# Patient Record
Sex: MALE | Race: WHITE
[De-identification: names, ages, dates, MRNs, and addresses within clinical notes are randomized per-mention and may not be internally consistent; named-entity substitution may affect disease eponyms.]

---

## 2019-01-30 ENCOUNTER — HOSPITAL ENCOUNTER (EMERGENCY)
Dept: HOSPITAL 75 - ER | Age: 19
Discharge: HOME | End: 2019-01-30
Payer: SELF-PAY

## 2019-01-30 VITALS — HEIGHT: 71 IN | BODY MASS INDEX: 28 KG/M2 | WEIGHT: 200 LBS

## 2019-01-30 VITALS — DIASTOLIC BLOOD PRESSURE: 99 MMHG | SYSTOLIC BLOOD PRESSURE: 152 MMHG
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
## 2019-01-30 DIAGNOSIS — S93.401A: Primary | ICD-10-CM
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
## 2019-01-30 PROCEDURE — 73630 X-RAY EXAM OF FOOT: CPT

## 2019-01-30 PROCEDURE — 93926 LOWER EXTREMITY STUDY: CPT

## 2019-01-30 PROCEDURE — 73610 X-RAY EXAM OF ANKLE: CPT

## 2019-01-30 NOTE — DIAGNOSTIC IMAGING REPORT
PROCEDURE: US right lower extremity venous.



TECHNIQUE: Multiple real-time grayscale images were obtained over

the right lower extremity in various projections. Additional

spectral analysis and color Doppler duplex images were also

obtained.



INDICATION: Leg pain and swelling.



There are no prior studies available for comparison. There is

generally good blood flow and compressibility at all levels.

There is no evidence for deep venous thrombosis.



IMPRESSION: There is no evidence for deep venous thrombosis of

the right lower extremity.



Dictated by: 



  Dictated on workstation # [id number]

## 2019-01-30 NOTE — CONSULTATION REPORT
DATE OF SERVICE:  01/30/2019



ORTHOPEDIC CONSULTATION



IMPRESSION:

Grade III right ankle sprain.



RECOMMENDATIONS:

1.  Controlled ankle motion walker boot immobilization right ankle.

2.  Continue crutch ambulation touchdown weightbearing right leg.

3.  Aggressive strict elevation of the right lower extremity above heart level.

4.  Continued ice application, right ankle and right foot times in next 48 to 72

hours.



HISTORY AND PHYSICAL EXAMINATION:

The patient is a 18-year-old male who was seen in the emergency room at

[facility].  The patient has been playing basketball with

friends on the Monday prior to this Wednesday examination.  The patient states

that he went to cut and push off on his right foot and his right ankle inverted

and rolled.  He noted immediate pain and difficulty ambulating afterwards.  The

patient had obtained crutches on the day following that.  He has been backing

off the weight that he was bearing on his right lower extremity.  He began

noticing increased pain and noted an inability to fully extend his

interphalangeal joint of the right great toe without discomfort, presented to

the emergency room.  There was a concern about the possibility of a compartment

syndrome and orthopedic consultation was requested.



On exam, the patient demonstrates significant swelling over the medial and

lateral aspect of the right ankle.  The patient has no tenderness over the

deltoid ligament.  The patient has significant tenderness over the entire

lateral ligamentous complex of the right ankle including the anterior

talofibular ligament, calcaneofibular ligament and the posterior talofibular

ligament.  There is no tenderness in the interosseous space.  He has swelling on

the dorsal aspect of the right foot.  He has intact sensation to the toes of the

right foot.  His dorsalis pedis is difficult to palpate.  He has good capillary

refill in the toes with compression of the nail bed.



The patient maintains the right toe in a 45-degree flexed position.  He has pain

when he attempts to actively extend the great toe on the right, but his toe can

be extended, when this is performed, he has some tenderness over the posterior

medial aspect of his right ankle.  On compressing his compartments in the right

ankle, the patient demonstrates no evidence of distention of his calf

compartments.  He has no pain with compression of his calf musculature on the

right compared to his uninvolved left leg.  There is ecchymosis over the

posterolateral aspect of the right heel.  There is ecchymosis on the lateral

aspect of the right ankle.  He does not have tenderness otitis over his anterior

compartment on deep palpation.



X-rays of the right ankle were reviewed revealing no fracture or dislocation,

there is no widening of the mortise.



X-rays also obtained in the emergency room in the patient's right foot

demonstrates no bony abnormalities.



Concern from the emergency room personnel was that the patient's difficulty in

extending his great toe on the right.  He does not have any clinical evidence of

elevated compartment pressures on his exam.  He has some mild discomfort with

ankle dorsiflexion and plantar flexion.  He has a significant amount of

tenderness with any attempts to passively inward the left ankle.  The fact his

x-rays demonstrated no abnormalities.  The fact that he has intact sensation,

good capillary refill was reviewed with the patient.  I would recommend the

patient be placed in a controlled ankle motion walker boot.  He does have

crutches at this time.  I have asked the patient to continue with touchdown

weightbearing only.  He has only been elevating his ankle somewhat at nighttime

and have discussed the fact that he is to strictly elevate his right foot and

ankle above heart level and continue with ice application to his right ankle to

decrease amount of swelling he has in his right lower extremity.  I will follow

the patient for repeat examination in the office in one week's time, advised the

patient that more than likely he will require the boot on his right ankle for at

least 3 weeks then we would advance him into Aircast type brace for that.



Thank you for allowing me to participate in this patient's care.





Job ID: [id number]

DocumentID: [id number]

Dictated Date:  01/30/2019 13:50:33

Transcription Date: 01/30/2019 23:03:56

Dictated By: MAE NIXON DO

## 2019-01-30 NOTE — DIAGNOSTIC IMAGING REPORT
EXAMINATION: Right foot 1024h. 



INDICATION: Injury



3 views were obtained.



There is no fracture, dislocation or acute bony abnormality

evident. The Lisfranc joint appears to be intact. There is mild

generalized soft tissue edema. There is no radiopaque foreign

body identified. 



IMPRESSION:

There is soft tissue edema of the foot but there is no for

evidence of an acute bony abnormality or for a radiopaque foreign

body. 



Dictated by: 



  Dictated on workstation # [id number]

## 2019-01-30 NOTE — DIAGNOSTIC IMAGING REPORT
EXAMINATION: Right ankle at 10:21H.



INDICATION: Injury ankle pain.



 3 views were obtained.



 There are no prior studies available for comparison.



There is no fracture, dislocation or acute bony abnormality

evident. The ankle mortise is not widened. The talar dome is

smooth.



There is generalized soft tissue edema about the ankle joint,

particularly over the lateral malleolus.



IMPRESSION:

There is soft tissue edema about the ankle joint but there is no

evidence for an acute bony abnormality. 



Dictated by: 



  Dictated on workstation # [id number]

## 2019-01-30 NOTE — DIAGNOSTIC IMAGING REPORT
INDICATION: Leg pain and swelling. 



EXAMINATION: Right lower extremity Doppler. Spectral and color

flow imaging of the arterial system of the right lower extremity

were performed. 



COMPARISON: There are no prior studies available for comparison. 



FINDINGS: There is good arterial blood flow in the common

femoral, superficial femoral and popliteal arteries. Triphasic

waveforms were seen and there is no abrupt alteration of the

velocities to suggest a hemodynamically significant stenosis. 



There is also blood flow in the trifurcation arteries although

the waveforms do appear dampened. Furthermore, there is a marked

reduction in the velocity in the dorsalis pedis artery. There is

only a small amount of blood flow in this vessel. The reason for

the diminished arterial blood flow is of uncertain etiology.

There is no evidence for an intra-arterial thrombus.



IMPRESSION:

1. There is generally good arterial blood flow to the right lower

extremity but there is markedly reduced velocity of the dorsalis

pedis artery.

2. These results were discussed with Dr. Milton in the ER.  



Dictated by: 



  Dictated on workstation # [id number]

## 2020-02-07 NOTE — ED LOWER EXTREMITY
General


Chief Complaint:  Lower Extremity


Stated Complaint:  ANKLE INJ


Nursing Triage Note:  


ARRIVED VIA AMB USING CRUTCHES.  STATES HE HURT HIS RIGHT FOOT/ANKLE PLAYING 


BASKETBALL X2 DAYS AGO.  WENT TO THE [facility] TODAY WHO SENT HIM 


HERE.


Nursing Sepsis Screen:  No Definite Risk


Source:  patient


Exam Limitations:  no limitations





History of Present Illness


Date Seen by Provider:  2019


Time Seen by Provider:  10:01


Initial Comments


This 18-year-old young man presents to the emergency room as directed by Dr. Joy from \[facility]\"" [facility] with concerns about a right foot and ankle 

injury.  Patient rolled his foot by inversion while playing basketball 2 days 

ago.  It has become markedly swollen and bruised.  This morning his toes became 

cool and pale with sluggish capillary refill.  His great toe has also become 

more fixed in a plantar flexed position.  Dr. Joy was concerned about 

possible compartment syndrome or limb ischemia and sent him to the emergency 

room.  He denies any other injury.  He has been ambulating with crutches.





Allergies and Home Medications


Allergies


Coded Allergies:  


     No Known Drug Allergies (Unverified , 19)





Home Medications


No Active Prescriptions or Reported Meds





Patient Home Medication List


Home Medication List Reviewed:  Yes





Review of Systems


Constitutional:  no symptoms reported


EENTM:  no symptoms reported


Respiratory:  no symptoms reported


Cardiovascular:  see HPI


Gastrointestinal:  no symptoms reported


Genitourinary:  no symptoms reported


Musculoskeletal:  see HPI


Skin:  see HPI


Psychiatric/Neurological:  No Symptoms Reported





Past Medical-Social-Family Hx


Past Med/Social Hx:  Reviewed Nursing Past Med/Soc Hx


Patient Social History


Recent Foreign Travel:  No


Contact w/Someone Who Travel:  No


Recent Infectious Disease Expo:  No





Seasonal Allergies


Seasonal Allergies:  No





Past Medical History


Surgeries:  No


Respiratory:  No


Cardiac:  No


Neurological:  No


Genitourinary:  No


Gastrointestinal:  No


Musculoskeletal:  No


Endocrine:  No


HEENT:  No


Cancer:  No


Psychosocial:  No


Integumentary:  No





Physical Exam


Vital Signs





Vital Signs - First Documented








 19





 10:05


 


Temp 98.0


 


Pulse 85


 


Resp 16


 


B/P (MAP) 167/97 (120)


 


Pulse Ox 96


 


O2 Delivery Room Air





Capillary Refill : Less Than 3 Seconds


Height, Weight, BMI


Height: 5'11.00"


Weight: 200lbs. oz. 90.530249xz;  BMI


Method:Stated


General Appearance:  WD/WN, mild distress


HEENT:  normal ENT inspection


Neck:  normal inspection


Cardiovascular:  regular rate, rhythm, no edema, no murmur


Respiratory:  lungs clear, normal breath sounds, no respiratory distress


Gastrointestinal:  non tender, soft


Legs:  bilateral leg non-tender, bilateral leg normal inspection, bilateral leg 

normal range of motion


Knees:  bilateral knee non-tender, bilateral knee normal inspection, bilateral 

knee normal range of motion, bilateral knee no evidence of injury


Ankles:  left ankle non-tender, left ankle normal inspection, left ankle normal 

range of motion; right ankle bone tenderness, right ankle ecchymosis, right 

ankle joint effusion, right ankle limited range of motion, right ankle pain, 

right ankle soft tissue tenderness, right ankle swelling


Feet:  left foot non-tender, left foot normal inspection, left foot normal 

range of motion, left foot no evidence of injury; right foot bone tenderness, 

right foot ecchymosis, right foot limited range of motion, right foot pain, 

right foot soft tissue tenderness, right foot swelling


Neurologic/Tendon:  normal sensation, normal motor functions


Neurologic/Psychiatric:  CNs II-XII nml as tested, no motor/sensory deficits, 

alert, normal mood/affect, oriented x 3


Skin:  warm/dry, ecchymosis





Progress/Results/Core Measures


Results/Orders


My Orders





Orders - PREET ROBLES MD


Us Right Low Ext [id number] (19 10:07)


Us Venous Lower Ext Rt (19 10:07)


Foot, Right, 3 View (19 10:07)


Ankle, Right, 3 Views (19 10:07)


Steplite (19 12:20)





Vital Signs/I&O











 19





 10:05 12:42


 


Temp 98.0 


 


Pulse 85 78


 


Resp 16 16


 


B/P (MAP) 167/97 (120) 152/99 (116)


 


Pulse Ox 96 98


 


O2 Delivery Room Air Room Air














Blood Pressure Mean:  120











Progress


Progress Note #1:  


   Time:  11:30


Progress Note


X-rays and arterial and venous ultrasounds were obtained.  X-rays of the foot 

and ankle showed no fractures.  Ultrasound demonstrated no venous abnormality.  

There was a significant decrease in flow in the dorsalis pedis artery but the 

artery was patent.  I discussed the case with both Dr. Reyez and Dr. Gomes.  

For the time being, Dr. Gomes recommended monitoring the patient.  He suggested 

significant elevation and a repeat exam in 1 to 2 hours.  He recommended 

testing for pain with passive dorsiflexion of the lesser toes.  This yielded 

very little change in pain level and was well-tolerated.  Dr. Gomes believes 

based on exam finding that compartment syndrome is of low probability.  If 

patient has any worsening of exam when reevaluated, we will consider obtaining 

compartment pressures.


Progress Note #2:  


Progress Note


Dr. Bhatti presented to the emergency room on behalf of Dr. Gomes to assess 

the patient.  He did not feel this patient was experiencing compartment 

syndrome.  He recommended a boot and crutches for 3 weeks and strict elevation.

  Discharge instructions were reviewed with the patient had notes for school 

were provided.





Diagnostic Imaging





   Diagonstic Imaging:  Xray


Comments


Right foot x-ray viewed by me and report reviewed.  Discussed with the 

radiologist.  See report below:





NAME:   DUDLEY POOLE D


MED REC#:   [id number]


ACCOUNT#:   [id number]


PT STATUS:   REG ER


:   2000


PHYSICIAN:   PREET ROBLES MD


ADMIT DATE:   19/ER


***Draft***


Date of Exam:19





FOOT, RIGHT, 3 VIEW





EXAMINATION: Right foot 1024h. 





INDICATION: Injury





3 views were obtained.





There is no fracture, dislocation or acute bony abnormality


evident. The Lisfranc joint appears to be intact. There is mild


generalized soft tissue edema. There is no radiopaque foreign


body identified. 





IMPRESSION:


There is soft tissue edema of the foot but there is no for


evidence of an acute bony abnormality or for a radiopaque foreign


body. 





  Dictated on workstation # [id number]





Dict:   19 1108


Trans:   19 1121


[facility] [phone]





Interpreted by:     GEORGE REYEZ MD








   Diagonstic Imaging:  Xray


   Plain Films/CT/US/NM/MRI:  ankle


Comments


Right ankle x-ray viewed by me and report reviewed.  Discussed with the 

radiologist.  See report below:





NAME:   POOLEGIRMADUDLEY ALICIA


MED REC#:   [id number]


ACCOUNT#:   [id number]


PT STATUS:   REG ER


:   2000


PHYSICIAN:   PREET ROBLES MD


ADMIT DATE:   19/ER


***Draft***


Date of Exam:19





ANKLE, RIGHT, 3 VIEWS





EXAMINATION: Right ankle at 10:21H.





INDICATION: Injury ankle pain.





 3 views were obtained.





 There are no prior studies available for comparison.





There is no fracture, dislocation or acute bony abnormality


evident. The ankle mortise is not widened. The talar dome is


smooth.





There is generalized soft tissue edema about the ankle joint,


particularly over the lateral malleolus.





IMPRESSION:


There is soft tissue edema about the ankle joint but there is no


evidence for an acute bony abnormality. 





  Dictated on workstation # [id number]





Dict:   19 1106


Trans:   19 1111


[facility] [id number]





Interpreted by:     GEORGE REYEZ MD





Departure


Impression





 Primary Impression:  


 Severe sprain of right ankle


 Qualified Codes:  S93.401A - Sprain of unspecified ligament of right ankle, 

initial encounter


Disposition:   HOME, SELF-CARE


Condition:  Improved





Departure-Patient Inst.


Decision time for Depature:  12:15


Patient Instructions:  Ankle Sprain (DC)





Add. Discharge Instructions:  


Use the boot as much as possible for the next 3 weeks.  You may remove the boot 

to shower.


Follow-up with Dr. Bhatti or Dr. Gomes within one week.


For pain take ibuprofen up to 600 mg every 6 hours as needed and/or Tylenol (

acetaminophen) up to 1000 mg every 6 hours.


Ice in 20 minute intervals to reduce pain and swelling.  You may place a large 

bag of ice over the boot or remove the boot to ice.


It is very important that she elevate your foot and lower leg as much as 

possible, preferably above the level of your heart.  This is the fastest way to 

reduce the swelling and improve the blood flow to your foot.


Return to care immediately if you develop loss of feeling in your toes or a 

severe increase in pain in your foot or if you develop loss of capillary refill 

in the skin of your foot or toes.











All discharge instructions reviewed with patient and/or family. Voiced 

understanding.


Scripts


No Active Prescriptions or Reported Meds


Work/School Note:  School/Childcare Release   Date Seen in the Emergency 

Department:  2019


      Return to School:  2019


      Other Restrictions Listed Below:  Elevate to desk level as much as 

possible for 3 weeks.


   Restrictions:  Allow accommodations for crutches and boot for 3 weeks.





Copy


Copies To 1:   KEHINDE JOY MD, JOSHUA T MD 2019 11:33 No